# Patient Record
(demographics unavailable — no encounter records)

---

## 2025-02-04 NOTE — DISCUSSION/SUMMARY
[de-identified] :  COTY BOWENS is a 79 year old female who presents with possible bilateral knee medial meniscus tears. An MRI of the patient's bilateral knee was ordered to evaluate for medial meniscus tear. The patient will follow up after imaging is completed to review the results in the office.

## 2025-02-04 NOTE — HISTORY OF PRESENT ILLNESS
[de-identified] : Ms. COTY BOWENS is a 79 year old female presenting for evaluation of bilateral knee pain, right worse than left. She has pain localized primarily medially in the bilateral knee. She notes intermittent swelling. No falls or trauma. She has not had injections or an MRI. She has tried PT and NSAIDs without improvement.

## 2025-02-04 NOTE — PHYSICAL EXAM
[de-identified] :  The patient appears well nourished and in no apparent distress. The patient is alert and oriented to person, place, and time. Affect and mood appear normal. The head is normocephalic and atraumatic. The eyes reveal normal sclera and extra ocular muscles are intact. The tongue is midline with no apparent lesions. Skin shows normal turgor with no evidence of eczema or psoriasis. No respiratory distress noted. Sensation grossly intact. [de-identified] :  Exam of the bilateral knee shows full extension.  There is no effusion. There is posterior knee pain with deep flexion. There is no popliteal fullness.  Pain with palpation along the posterior medial joint line. 5/5 motor strength bilaterally distally. Sensation intact distally. [de-identified] : X-ray: 4 views of the right knee demonstrate mild patellofemoral compartment arthritis X-ray: 4 views of the left knee demonstrate mild patellofemoral compartment arthritis

## 2025-02-25 NOTE — HISTORY OF PRESENT ILLNESS
[de-identified] : Ms. COTY BOWENS is a 79 year old female presenting for evaluation of bilateral knee pain, right worse than left. She has pain localized primarily medially in the bilateral knee. She notes intermittent swelling. No falls or trauma. She has not had injections. She has tried PT and NSAIDs without improvement. She presents today to review her MRI results. 
independent
Dosing Month 2 (Required For Cumulative Dosing): 40mg Daily

## 2025-02-25 NOTE — DISCUSSION/SUMMARY
[de-identified] :  COTY BOWENS is a 79 year old female who presents with bilateral knee mild patellofemoral compartment arthritis.  Nonoperative treatment options for knee arthritis were discussed including anti-inflammatories, physical therapy, intraarticular cortisone injections, and hyaluronic acid gel injections.  A series of hyaluronic acid gel injections was ordered for the patient and are pending insurance approval. The office will follow up with the patient when they become available.  A prescription for physical therapy was provided.

## 2025-02-25 NOTE — PHYSICAL EXAM
[de-identified] :  The patient appears well nourished and in no apparent distress. The patient is alert and oriented to person, place, and time. Affect and mood appear normal. The head is normocephalic and atraumatic. The eyes reveal normal sclera and extra ocular muscles are intact. The tongue is midline with no apparent lesions. Skin shows normal turgor with no evidence of eczema or psoriasis. No respiratory distress noted. Sensation grossly intact. [de-identified] :  Exam of the bilateral knee shows full extension. There is no effusion. There is posterior knee pain with deep flexion. There is no popliteal fullness. Pain with palpation along the posterior medial joint line. 5/5 motor strength bilaterally distally. Sensation intact distally. [de-identified] : Prior X-ray: 4 views of the right knee demonstrate mild patellofemoral compartment arthritis Prior X-ray: 4 views of the left knee demonstrate mild patellofemoral compartment arthritis.  MRI of the right knee done by [facility] on 2/10/25  Impression and results from my independent review in office today and radiology report: There is minimal free edge blunting of the lateral meniscus body segment without additional evidence for medial or lateral meniscal tear.  MRI of the left knee done by [facility] on 2/10/25  Impression and results from my independent review in office today and radiology report: Partial tearing of the medial meniscus posterior horn meniscocapsular junction without additional evidence for medial or lateral meniscal tear.  Mild patellofemoral compartment chondral wear.

## 2025-04-22 NOTE — REASON FOR VISIT
[Follow-Up Visit] : a follow-up visit for [Other: ____] : [unfilled] [FreeTextEntry2] : Bilateral knee Synvisc Injection #1, Lot# [id number], Expires 06/30/2027.

## 2025-04-22 NOTE — PROCEDURE
[de-identified] : Allergies: The patient denies allergies to medications and has no allergies to chicken,eggs, or feathers. Procedure: The patient has been identified by name and date of birth. Patient confirms that we are treating the bilateral knee today. The knee was prepped in the usual sterile fashion. The knee joint space was identified using ultrasound. The areas were cleansed with chlorhexadine, then sprayed with ethyl chloride. The patient was then injected with the synvisc into the bilateral knee using ultrasound guidance  and the needle position in the superolateral joint space was confirmed. The patient tolerated the procedure well. The medication was delivered aseptically and atraumatically. Diagnosis: Osteoarthritis of the bilateral knee Treatment: The patient was advised on the activities for today. I gave the patient instructions on postinjection ice and analgesia.

## 2025-04-22 NOTE — PHYSICAL EXAM
[de-identified] :  The patient appears well nourished and in no apparent distress. The patient is alert and oriented to person, place, and time. Affect and mood appear normal. The head is normocephalic and atraumatic. The eyes reveal normal sclera and extra ocular muscles are intact. The tongue is midline with no apparent lesions. Skin shows normal turgor with no evidence of eczema or psoriasis. No respiratory distress noted. Sensation grossly intact. [de-identified] :  Exam of the bilateral knee shows full extension. There is no effusion. There is posterior knee pain with deep flexion. There is no popliteal fullness. Pain with palpation along the posterior medial joint line. 5/5 motor strength bilaterally distally. Sensation intact distally. [de-identified] : Prior X-ray: 4 views of the right knee demonstrate mild patellofemoral compartment arthritis Prior X-ray: 4 views of the left knee demonstrate mild patellofemoral compartment arthritis.  MRI of the right knee done by [facility] on 2/10/25  Impression and results from my independent review in office today and radiology report: There is minimal free edge blunting of the lateral meniscus body segment without additional evidence for medial or lateral meniscal tear.  MRI of the left knee done by [facility] on 2/10/25  Impression and results from my independent review in office today and radiology report: Partial tearing of the medial meniscus posterior horn meniscocapsular junction without additional evidence for medial or lateral meniscal tear.  Mild patellofemoral compartment chondral wear.

## 2025-04-22 NOTE — HISTORY OF PRESENT ILLNESS
[de-identified] : Ms. COTY BOWENS is a 79 year old female presenting for evaluation of bilateral knee pain, right worse than left. She has pain localized primarily medially in the bilateral knee. She notes intermittent swelling. No falls or trauma. She has not had injections. She has tried PT and NSAIDs without improvement. She presents today to begin gel injections.

## 2025-04-22 NOTE — DISCUSSION/SUMMARY
[de-identified] :  COTY BOWENS is a 79 year old female who presents with bilateral knee mild patellofemoral compartment arthritis.  Nonoperative treatment options for knee arthritis were discussed including anti-inflammatories, physical therapy, intraarticular cortisone injections, and hyaluronic acid gel injections.  Patient received the first of three synvisc injections to the bilateral knee and tolerated well. Follow up in one week.

## 2025-04-29 NOTE — HISTORY OF PRESENT ILLNESS
[de-identified] : COTY is a 78 y/o female that presents today for b/l knee SYNVISC injection #2. She denies improvement thus far.  Allergies: The patient denies allergies to medications and has no allergies to chicken,eggs, or feathers. Procedure: The patient has been identified by name and date of birth. Patient confirms that we are treating the bilateral knee today. The knee was prepped in the usual sterile fashion. The knee joint space was identified using ultrasound. The areas were cleansed with chlorhexadine, then sprayed with ethyl chloride. The patient was then injected with the synvisc into the bilateral knee using ultrasound guidance and the needle position in the superolateral joint space was confirmed. The patient tolerated the procedure well. The medication was delivered aseptically and atraumatically. Diagnosis: Osteoarthritis of the bilateral knee Treatment: The patient was advised on the activities for today. I gave the patient instructions on postinjection ice and analgesia. F/U in 1 week for the 3rd injection bilaterally.

## 2025-04-29 NOTE — REASON FOR VISIT
[Follow-Up Visit] : a follow-up visit for [FreeTextEntry2] :  Bilateral knee Synvisc Injection #2, Lot# [id number], Expires 06/30/2027.

## 2025-05-06 NOTE — REASON FOR VISIT
[Follow-Up Visit] : a follow-up visit for [Other: ____] : [unfilled] [FreeTextEntry2] : Bilateral knee Euflexxa Injection #3, Lot# [id number], Expires 06/30/2027.

## 2025-05-06 NOTE — HISTORY OF PRESENT ILLNESS
[de-identified] : Patient is here for the third Euflexxa injection for the bilateral knees.  Allergies: The patient denies allergies to medications and has no allergies to chicken,eggs, or feathers. Procedure: The patient has been identified by name and date of birth. Patient confirms that we are treating the bilateral knee today. The knee was prepped in the usual sterile fashion. The knee joint space was identified using ultrasound. The areas were cleansed with chlorhexadine, then sprayed with ethyl chloride. The patient was then injected with the Euflexxa into the bilateral knee using ultrasound guidance  and the needle position in the superolateral joint space was confirmed. The patient tolerated the procedure well. The medication was delivered aseptically and atraumatically. Diagnosis: Osteoarthritis of the bilateral knee Treatment: The patient was advised on the activities for today. I gave the patient instructions on postinjection ice and analgesia. The patient will follow up in 6 weeks